# Patient Record
Sex: FEMALE | Race: BLACK OR AFRICAN AMERICAN
[De-identification: names, ages, dates, MRNs, and addresses within clinical notes are randomized per-mention and may not be internally consistent; named-entity substitution may affect disease eponyms.]

---

## 2019-10-28 ENCOUNTER — HOSPITAL ENCOUNTER (OUTPATIENT)
Dept: HOSPITAL 76 - SDS | Age: 29
Discharge: HOME | End: 2019-10-28
Attending: OBSTETRICS & GYNECOLOGY
Payer: COMMERCIAL

## 2019-10-28 VITALS — DIASTOLIC BLOOD PRESSURE: 77 MMHG | SYSTOLIC BLOOD PRESSURE: 125 MMHG

## 2019-10-28 DIAGNOSIS — D27.1: Primary | ICD-10-CM

## 2019-10-28 DIAGNOSIS — F17.290: ICD-10-CM

## 2019-10-28 DIAGNOSIS — N94.6: ICD-10-CM

## 2019-10-28 DIAGNOSIS — E66.9: ICD-10-CM

## 2019-10-28 DIAGNOSIS — Z30.433: ICD-10-CM

## 2019-10-28 PROCEDURE — 0UB14ZZ EXCISION OF LEFT OVARY, PERCUTANEOUS ENDOSCOPIC APPROACH: ICD-10-PCS | Performed by: OBSTETRICS & GYNECOLOGY

## 2019-10-28 PROCEDURE — 0U2DXHZ CHANGE CONTRACEPTIVE DEVICE IN UTERUS AND CERVIX, EXTERNAL APPROACH: ICD-10-PCS | Performed by: OBSTETRICS & GYNECOLOGY

## 2019-10-28 RX ADMIN — HYDROMORPHONE HYDROCHLORIDE ONE MG: 1 INJECTION, SOLUTION INTRAMUSCULAR; INTRAVENOUS; SUBCUTANEOUS at 14:05

## 2019-10-28 RX ADMIN — HYDROMORPHONE HYDROCHLORIDE ONE MG: 1 INJECTION, SOLUTION INTRAMUSCULAR; INTRAVENOUS; SUBCUTANEOUS at 14:23

## 2019-10-28 NOTE — OPERATIVE REPORT
Operative Report





- General


Procedure Date: 10/28/19


Planned Procedure: 





Diagnostic laparoscopy, left ovarian cystectomy, removal and replacement of 

Mirena IUD


Pre-Op Diagnosis: Left ovarian dermoid cyst


Procedure Performed: 





Same as above


Post Op Diagnosis: Same as above





- Procedure Note


Primary Surgeon: Kari


Secondary Surgeon: Brandy


Anesthesia Provider: 


Anesthesia Technique: General ET tube


Pathology: 





Left ovarian dermoid cyst


Indications: 





1.  Left ovarian dermoid cyst


2.  IUD with missing string


3.  Desire for IUD replacement


Findings: 





Exam under anesthesia:


The uterus was of normal size, shape, and consistency and anteverted..  The 

adnexa were benign.





Operative findings:


The uterus was of normal size, shape, and anteverted.  The tubes and right ovary

appeared normal.  The left ovary was 5 cm in diameter and contained what 

appeared to be a dermoid cyst.  The anterior and posterior cul-de-sacs, right 

and left ovarian fossae, and liver edge and gallbladder all appeared 

unremarkable.  There was no obvious evidence of endometriosis.


Complications: 





None





- Other


Other Information/Narrative: 








The patient was taken to the operating room, where general endotracheal 

anesthesia was administered without difficulty.  She was then positioned in the 

low dorsal lithotomy position with her lower extremities in Yellow Fin stirrups.

 Vagina, perineum, and abdomen were then prepped and draped in a sterile 

fashion.  Procedure Time-Out was then performed. An exam under anesthesia was 

performed.  A sterile bivalve speculum was then inserted into the vagina. The 

old Mirena IUD was removed. The anterior lip of the cervix was grasped with a 

Hulka tenaculum  and a Vann catheter was placed..  





The speculum was then removed, and attention was turned to the laparoscopy.  

0.5% Marcaine was injected infraumbilically, then a 7-mm horizontal skin 

incision made.  A 0-degree, 5 mm laparoscope was inserted into a 5 mm trocar and

passed through the anterior layers of the abdominal wall using Optiview 

technique. The abdomen was then insufflated with carbon dioxide. The abdomen was

visualized, then 2 additional ports placed at the left and right lower 

quadrants, first instilling local anesthetic, then placing the ports under 

direct visualization with the laparoscope.  





The patient was placed into Trendelenberg and bowel swept out of the cul-de-sac.

 The pelvis was visualized with the findings as noted above. The J hook was used

to make an incision over the anterior surface of the dermoid cyst and traction 

countertraction technique was used to expose the cyst which was then freed with 

the LigaSure cutting coagulating device.  The left lower quadrant 5 mm trocar 

site was enlarged to allow the passage of an 11 mm trocar.  A 10 mm Endo Catch 

bag was placed through that trocar and the dermoid cyst was removed and sent for

pathologic examination.  The abdomen was then copiously irrigated irrigated with

normal saline.  The ovarian bed was checked for hemostasis which was judged 

adequate..  At this point the laparoscopy was deemed complete, and all trocars 

were removed from the abdomen.  The carbon dioxide gas was then allowed to e

scape.  The incisions were then closed with 4-0 Monocryl in a subcuticular 

fashion followed by Dermabond skin adhesive.  The uterine manipulator was 

removed. A new Mirena IUD was placed without difficulty and the strings 

shortened to 3 cm. At this point the procedure was deemed complete.





The patient was then replaced supine, awakened, extubated, and transferred to 

the PACU in stable condition.  There were no complications.  Sponge, lap, and 

needle count were correct x 3.  There were no complications.

## 2019-10-28 NOTE — ANESTHESIA
Pre-Anesthesia VS, & Labs





- NPO


>8 hours





- Pregnancy


Is Patient Pregnant?: Waiver signed





<Elvis Costello - Last Filed: 10/28/19 12:06>





- Diagnosis





Left ovarian dermoid cyst (Elvis Costello)





- Procedure





Lap ovarian Left cystectomy, IUD removal/replacement (Elvis Costello)


Vital Signs: 





                                        











Temp Pulse Resp BP Pulse Ox


 


 38.2 C H  54 L  16   134/88 H  100 


 


 10/28/19 10:46  10/28/19 10:46  10/28/19 10:46  10/28/19 10:46  10/28/19 10:46














                                        





Height                           5 ft 7 in


Weight (kg)                      83.46 kg











Home Medications and Allergies





<MEGAN KELLY - Last Filed: 10/28/19 11:08>





<Elvis Costello - Last Filed: 10/28/19 12:06>


Home Medications: 


Ambulatory Orders





Levonorgestrel 14 Mcg/24Hr [Korina] 1 each IY 10/08/19 


Naproxen 500 mg PO 10/08/19 


Sumatriptan Succinate [Imitrex] 50 mg PO PRN 10/08/19 











                                        





Levonorgestrel 14 Mcg/24Hr [Korina] 1 each IY 10/08/19 


Naproxen 500 mg PO 10/08/19 


Sumatriptan Succinate [Imitrex] 50 mg PO PRN 10/08/19 








Allergies/Adverse Reactions: 


                                    Allergies











Allergy/AdvReac Type Severity Reaction Status Date / Time


 


No Known Drug Allergies Allergy   Verified 10/08/19 09:26














Anes History & Medical History





- Anesthetic History


Anesthesia Complications: reports: No previous complications


Family history of Anesthesia Complications: Denies


Family history of Malignant Hyperthermia: Denies





- Medical History


Cardiovascular: reports: None


Pulmonary: reports: None (vapes)


Gastrointestinal: reports: None


Urinary: reports: None, Other


Neuro: reports: None


Musculoskeletal: reports: None


Endocrine/Autoimmune: reports: None


Blood Disorders: reports: None


Skin: reports: None


Smoking Status: Current every day smoker (vapes)


Psychosocial: reports: No issues indicated





<MEGAN KELLY - Last Filed: 10/28/19 11:08>





Exam


General: Alert, Oriented x3, Cooperative, No acute distress


Respiratory: Lungs clear, Normal breath sounds, No respiratory distress, No 

accessory muscle use


Cardiovascular: Regular rate, Normal S1, Normal S2, No murmurs


Abdomen: Normal bowel sounds, Soft, No tenderness, No hepatospenomegaly, No 

masses


Extremities: No clubbing, No cyanosis, No edema, Normal pulses, No 

tenderness/swelling


Neurological: Normal gait, Normal speech, Strength at 5/5 X4 ext, Normal tone, 

Sensation intact, Cranial nerves 3-12 NL, Reflexes 2+


Mental/Cognitive Status: Alert/Oriented X3, Normal for patient


Cognitive Status: Within normal limits





<MEGAN KELLY B - Last Filed: 10/28/19 11:08>


Dental: WNL


Mouth Opening: 3 Fingerbreadth


Neck Mobility: Normal


Mallampati classification: II





<Elvis Costello P - Last Filed: 10/28/19 12:06>





Plan


Anesthesia Type: General


Consent for Procedure(s) Verified and Reviewed: Yes


ASA classification: 2-Mild systemic disease (vapes,  reports drinking two to 

three times a week.)





<MEGAN KELLY - Last Filed: 10/28/19 11:08>


Anesthesia Type: General


Code Status: Attempt Resuscitation


Is this case an emergency?: No





<Elvis Costello - Last Filed: 10/28/19 12:06>